# Patient Record
Sex: FEMALE | Race: WHITE | NOT HISPANIC OR LATINO | Employment: UNEMPLOYED | URBAN - METROPOLITAN AREA
[De-identification: names, ages, dates, MRNs, and addresses within clinical notes are randomized per-mention and may not be internally consistent; named-entity substitution may affect disease eponyms.]

---

## 2023-09-01 PROBLEM — I20.9 ANGINA PECTORIS (CMS-HCC): Status: ACTIVE | Noted: 2023-09-01

## 2023-09-01 PROBLEM — K29.60: Status: ACTIVE | Noted: 2023-09-01

## 2023-09-01 PROBLEM — E55.9 VITAMIN D DEFICIENCY, UNSPECIFIED: Status: ACTIVE | Noted: 2023-09-01

## 2023-09-01 PROBLEM — E78.5 HYPERLIPIDEMIA: Status: ACTIVE | Noted: 2023-09-01

## 2023-09-01 PROBLEM — I10 BENIGN ESSENTIAL HYPERTENSION: Status: ACTIVE | Noted: 2023-09-01

## 2023-09-01 PROBLEM — I10 HYPERTENSIVE DISORDER: Status: ACTIVE | Noted: 2023-09-01

## 2023-09-01 PROBLEM — E83.52 HYPERCALCEMIA: Status: ACTIVE | Noted: 2023-09-01

## 2023-09-01 PROBLEM — I48.20 CHRONIC ATRIAL FIBRILLATION (MULTI): Status: ACTIVE | Noted: 2023-09-01

## 2023-09-01 PROBLEM — M81.0 AGE-RELATED OSTEOPOROSIS WITHOUT CURRENT PATHOLOGICAL FRACTURE: Status: ACTIVE | Noted: 2023-09-01

## 2023-09-01 PROBLEM — E87.1 HYPONATREMIA: Status: ACTIVE | Noted: 2023-09-01

## 2023-09-01 PROBLEM — H04.123 DRY EYES, BILATERAL: Status: ACTIVE | Noted: 2023-09-01

## 2023-09-01 PROBLEM — R07.9 CHEST PAIN: Status: ACTIVE | Noted: 2023-09-01
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
## 2023-09-01 PROBLEM — E03.9 HYPOTHYROIDISM (ACQUIRED): Status: ACTIVE | Noted: 2023-09-01

## 2023-09-01 PROBLEM — E21.3 HYPERPARATHYROIDISM (MULTI): Status: ACTIVE | Noted: 2023-09-01

## 2023-09-01 PROBLEM — I49.5 SINUS NODE DYSFUNCTION (MULTI): Status: ACTIVE | Noted: 2023-09-01

## 2023-09-01 PROBLEM — R54 FRAIL ELDERLY: Status: ACTIVE | Noted: 2023-09-01

## 2023-09-01 PROBLEM — K29.31 CHRONIC SUPERFICIAL GASTRITIS WITH BLEEDING: Status: ACTIVE | Noted: 2023-09-01

## 2023-09-01 PROBLEM — I25.10 ASHD (ARTERIOSCLEROTIC HEART DISEASE): Status: ACTIVE | Noted: 2023-09-01

## 2023-09-01 PROBLEM — R10.9 ABDOMINAL PAIN: Status: ACTIVE | Noted: 2023-09-01

## 2023-09-01 PROBLEM — Z86.59 MENTAL STATUS CHANGE RESOLVED: Status: ACTIVE | Noted: 2023-09-01

## 2023-09-01 PROBLEM — J45.998 ASTHMA, PERSISTENT CONTROLLED (HHS-HCC): Status: ACTIVE | Noted: 2023-09-01

## 2023-09-01 PROBLEM — R55 NEAR SYNCOPE: Status: ACTIVE | Noted: 2023-09-01

## 2023-09-01 PROBLEM — Z95.0 PACEMAKER: Status: ACTIVE | Noted: 2023-09-01

## 2023-09-01 PROBLEM — R42 DIZZINESS: Status: ACTIVE | Noted: 2023-09-01

## 2023-09-01 PROBLEM — F32.9 REACTIVE DEPRESSION: Status: ACTIVE | Noted: 2023-09-01

## 2023-09-01 PROBLEM — M06.9 RHEUMATOID ARTHRITIS IN REMISSION (MULTI): Status: ACTIVE | Noted: 2023-09-01

## 2023-09-01 PROBLEM — I34.1 MITRAL VALVE PROLAPSE: Status: ACTIVE | Noted: 2023-09-01

## 2023-09-01 PROBLEM — R55 SYNCOPE: Status: ACTIVE | Noted: 2023-09-01

## 2023-09-01 PROBLEM — F03.90 DEMENTIA (MULTI): Status: ACTIVE | Noted: 2023-09-01

## 2023-09-01 PROBLEM — R40.1 CLOUDED CONSCIOUSNESS: Status: ACTIVE | Noted: 2023-09-01

## 2023-09-01 RX ORDER — GUAIFENESIN 600 MG/1
600 TABLET, EXTENDED RELEASE ORAL EVERY 12 HOURS
COMMUNITY

## 2023-09-01 RX ORDER — DOCUSATE SODIUM 100 MG/1
100 CAPSULE, LIQUID FILLED ORAL DAILY PRN
COMMUNITY

## 2023-09-01 RX ORDER — AMLODIPINE BESYLATE 10 MG/1
1 TABLET ORAL 2 TIMES DAILY
COMMUNITY

## 2023-09-01 RX ORDER — BETAMETHASONE DIPROPIONATE 0.5 MG/G
1 CREAM TOPICAL DAILY
COMMUNITY

## 2023-09-01 RX ORDER — CYCLOSPORINE 0.5 MG/ML
1 EMULSION OPHTHALMIC 2 TIMES DAILY
COMMUNITY

## 2023-09-01 RX ORDER — RANOLAZINE 500 MG/1
500 TABLET, EXTENDED RELEASE ORAL 2 TIMES DAILY
COMMUNITY

## 2023-09-01 RX ORDER — LISINOPRIL 20 MG/1
20 TABLET ORAL DAILY
COMMUNITY

## 2023-09-01 RX ORDER — ISOSORBIDE MONONITRATE 60 MG/1
60 TABLET, EXTENDED RELEASE ORAL EVERY MORNING
COMMUNITY

## 2023-09-01 RX ORDER — NITROGLYCERIN 0.4 MG/1
TABLET SUBLINGUAL
COMMUNITY

## 2023-09-01 RX ORDER — LEVOTHYROXINE SODIUM 50 UG/1
50 TABLET ORAL
COMMUNITY

## 2023-09-01 RX ORDER — SOTALOL HYDROCHLORIDE 120 MG/1
120 TABLET ORAL 2 TIMES DAILY
COMMUNITY

## 2023-09-01 RX ORDER — BROMPHENIRAMINE MALEATE, DEXTROMETHORPHAN HBR, PHENYLEPHRINE HCL, DIPHENHYDRAMINE HCL, PHENYLEPHRINE HCL 0.52G
2 KIT ORAL 3 TIMES DAILY
COMMUNITY

## 2023-09-01 RX ORDER — OMEPRAZOLE 20 MG/1
20 CAPSULE, DELAYED RELEASE ORAL
COMMUNITY

## 2023-09-01 RX ORDER — FLUTICASONE PROPIONATE 50 MCG
SPRAY, SUSPENSION (ML) NASAL DAILY
COMMUNITY

## 2023-09-01 RX ORDER — ATORVASTATIN CALCIUM 10 MG/1
10 TABLET, FILM COATED ORAL DAILY
COMMUNITY
End: 2024-01-17

## 2023-09-01 RX ORDER — WARFARIN 3 MG/1
1 TABLET ORAL
COMMUNITY

## 2023-11-08 ENCOUNTER — HOSPITAL ENCOUNTER (OUTPATIENT)
Dept: CARDIOLOGY | Facility: CLINIC | Age: 83
Discharge: HOME | End: 2023-11-08
Payer: MEDICARE

## 2023-11-08 DIAGNOSIS — I48.0 PAF (PAROXYSMAL ATRIAL FIBRILLATION) (MULTI): ICD-10-CM

## 2023-11-08 DIAGNOSIS — I49.5 SINUS NODE DYSFUNCTION (MULTI): ICD-10-CM

## 2023-11-08 DIAGNOSIS — Z95.0 PACEMAKER: ICD-10-CM

## 2023-11-08 PROCEDURE — 93296 REM INTERROG EVL PM/IDS: CPT

## 2023-12-04 ENCOUNTER — HOSPITAL ENCOUNTER (OUTPATIENT)
Dept: CARDIOLOGY | Facility: CLINIC | Age: 83
Discharge: HOME | End: 2023-12-04
Payer: MEDICARE

## 2023-12-04 DIAGNOSIS — I49.5 SINUS NODE DYSFUNCTION (MULTI): ICD-10-CM

## 2023-12-04 DIAGNOSIS — Z95.0 PACEMAKER: ICD-10-CM

## 2023-12-04 DIAGNOSIS — I48.0 PAF (PAROXYSMAL ATRIAL FIBRILLATION) (MULTI): ICD-10-CM

## 2023-12-06 ENCOUNTER — APPOINTMENT (OUTPATIENT)
Dept: CARDIOLOGY | Facility: CLINIC | Age: 83
End: 2023-12-06
Payer: MEDICARE

## 2023-12-14 ENCOUNTER — ANCILLARY PROCEDURE (OUTPATIENT)
Dept: EMERGENCY MEDICINE | Facility: HOSPITAL | Age: 83
End: 2023-12-14
Payer: MEDICARE

## 2023-12-14 ENCOUNTER — HOSPITAL ENCOUNTER (EMERGENCY)
Facility: HOSPITAL | Age: 83
Discharge: HOME | End: 2023-12-14
Attending: EMERGENCY MEDICINE
Payer: MEDICARE

## 2023-12-14 ENCOUNTER — APPOINTMENT (OUTPATIENT)
Dept: RADIOLOGY | Facility: HOSPITAL | Age: 83
End: 2023-12-14
Payer: MEDICARE

## 2023-12-14 VITALS
DIASTOLIC BLOOD PRESSURE: 71 MMHG | OXYGEN SATURATION: 99 % | HEIGHT: 64 IN | BODY MASS INDEX: 18.78 KG/M2 | HEART RATE: 71 BPM | WEIGHT: 110 LBS | SYSTOLIC BLOOD PRESSURE: 103 MMHG | TEMPERATURE: 98.4 F | RESPIRATION RATE: 17 BRPM

## 2023-12-14 DIAGNOSIS — R10.9 ABDOMINAL PAIN, UNSPECIFIED ABDOMINAL LOCATION: ICD-10-CM

## 2023-12-14 DIAGNOSIS — N39.0 URINARY TRACT INFECTION WITHOUT HEMATURIA, SITE UNSPECIFIED: ICD-10-CM

## 2023-12-14 DIAGNOSIS — K59.00 CONSTIPATION, UNSPECIFIED CONSTIPATION TYPE: Primary | ICD-10-CM

## 2023-12-14 LAB
ALBUMIN SERPL BCP-MCNC: 3.8 G/DL (ref 3.4–5)
ALP SERPL-CCNC: 79 U/L (ref 33–136)
ALT SERPL W P-5'-P-CCNC: 15 U/L (ref 7–45)
ANION GAP BLDV CALCULATED.4IONS-SCNC: 11 MMOL/L (ref 10–25)
ANION GAP SERPL CALC-SCNC: 11 MMOL/L (ref 10–20)
APPEARANCE UR: CLEAR
AST SERPL W P-5'-P-CCNC: 20 U/L (ref 9–39)
ATRIAL RATE: 81 BPM
BASE EXCESS BLDV CALC-SCNC: 2.6 MMOL/L (ref -2–3)
BASOPHILS # BLD AUTO: 0.06 X10*3/UL (ref 0–0.1)
BASOPHILS NFR BLD AUTO: 0.9 %
BILIRUB SERPL-MCNC: 0.5 MG/DL (ref 0–1.2)
BILIRUB UR STRIP.AUTO-MCNC: NEGATIVE MG/DL
BODY TEMPERATURE: 37 DEGREES CELSIUS
BUN SERPL-MCNC: 18 MG/DL (ref 6–23)
CA-I BLDV-SCNC: 1.24 MMOL/L (ref 1.1–1.33)
CALCIUM SERPL-MCNC: 9.6 MG/DL (ref 8.6–10.6)
CARDIAC TROPONIN I PNL SERPL HS: 5 NG/L (ref 0–34)
CHLORIDE BLDV-SCNC: 100 MMOL/L (ref 98–107)
CHLORIDE SERPL-SCNC: 101 MMOL/L (ref 98–107)
CO2 SERPL-SCNC: 30 MMOL/L (ref 21–32)
COLOR UR: YELLOW
CREAT SERPL-MCNC: 0.92 MG/DL (ref 0.5–1.05)
EOSINOPHIL # BLD AUTO: 0.11 X10*3/UL (ref 0–0.4)
EOSINOPHIL NFR BLD AUTO: 1.7 %
ERYTHROCYTE [DISTWIDTH] IN BLOOD BY AUTOMATED COUNT: 12.8 % (ref 11.5–14.5)
GFR SERPL CREATININE-BSD FRML MDRD: 63 ML/MIN/1.73M*2
GLUCOSE BLDV-MCNC: 139 MG/DL (ref 74–99)
GLUCOSE SERPL-MCNC: 155 MG/DL (ref 74–99)
GLUCOSE UR STRIP.AUTO-MCNC: NEGATIVE MG/DL
HCO3 BLDV-SCNC: 28.8 MMOL/L (ref 22–26)
HCT VFR BLD AUTO: 32.8 % (ref 36–46)
HCT VFR BLD EST: 32 % (ref 36–46)
HGB BLD-MCNC: 10.9 G/DL (ref 12–16)
HGB BLDV-MCNC: 10.7 G/DL (ref 12–16)
HOLD SPECIMEN: NORMAL
IMM GRANULOCYTES # BLD AUTO: 0.02 X10*3/UL (ref 0–0.5)
IMM GRANULOCYTES NFR BLD AUTO: 0.3 % (ref 0–0.9)
KETONES UR STRIP.AUTO-MCNC: NEGATIVE MG/DL
LACTATE BLDV-SCNC: 2.3 MMOL/L (ref 0.4–2)
LEUKOCYTE ESTERASE UR QL STRIP.AUTO: NEGATIVE
LIPASE SERPL-CCNC: 47 U/L (ref 9–82)
LYMPHOCYTES # BLD AUTO: 1.08 X10*3/UL (ref 0.8–3)
LYMPHOCYTES NFR BLD AUTO: 17 %
MAGNESIUM SERPL-MCNC: 2.12 MG/DL (ref 1.6–2.4)
MCH RBC QN AUTO: 30.1 PG (ref 26–34)
MCHC RBC AUTO-ENTMCNC: 33.2 G/DL (ref 32–36)
MCV RBC AUTO: 91 FL (ref 80–100)
MONOCYTES # BLD AUTO: 0.44 X10*3/UL (ref 0.05–0.8)
MONOCYTES NFR BLD AUTO: 6.9 %
NEUTROPHILS # BLD AUTO: 4.65 X10*3/UL (ref 1.6–5.5)
NEUTROPHILS NFR BLD AUTO: 73.2 %
NITRITE UR QL STRIP.AUTO: POSITIVE
NRBC BLD-RTO: 0 /100 WBCS (ref 0–0)
OXYHGB MFR BLDV: 61.2 % (ref 45–75)
PCO2 BLDV: 51 MM HG (ref 41–51)
PH BLDV: 7.36 PH (ref 7.33–7.43)
PH UR STRIP.AUTO: 7 [PH]
PLATELET # BLD AUTO: 190 X10*3/UL (ref 150–450)
PO2 BLDV: 39 MM HG (ref 35–45)
POTASSIUM BLDV-SCNC: 3.8 MMOL/L (ref 3.5–5.3)
POTASSIUM SERPL-SCNC: 5.1 MMOL/L (ref 3.5–5.3)
PR INTERVAL: 348 MS
PROT SERPL-MCNC: 6.7 G/DL (ref 6.4–8.2)
PROT UR STRIP.AUTO-MCNC: NEGATIVE MG/DL
Q ONSET: 218 MS
QRS COUNT: 14 BEATS
QRS DURATION: 126 MS
QT INTERVAL: 420 MS
QTC CALCULATION(BAZETT): 487 MS
QTC FREDERICIA: 464 MS
R AXIS: 35 DEGREES
RBC # BLD AUTO: 3.62 X10*6/UL (ref 4–5.2)
RBC # UR STRIP.AUTO: NEGATIVE /UL
RBC #/AREA URNS AUTO: NORMAL /HPF
SAO2 % BLDV: 62 % (ref 45–75)
SODIUM BLDV-SCNC: 136 MMOL/L (ref 136–145)
SODIUM SERPL-SCNC: 137 MMOL/L (ref 136–145)
SP GR UR STRIP.AUTO: 1.02
T AXIS: 92 DEGREES
T OFFSET: 428 MS
UROBILINOGEN UR STRIP.AUTO-MCNC: <2 MG/DL
VENTRICULAR RATE: 81 BPM
WBC # BLD AUTO: 6.4 X10*3/UL (ref 4.4–11.3)
WBC #/AREA URNS AUTO: NORMAL /HPF

## 2023-12-14 PROCEDURE — 2500000004 HC RX 250 GENERAL PHARMACY W/ HCPCS (ALT 636 FOR OP/ED)

## 2023-12-14 PROCEDURE — 84484 ASSAY OF TROPONIN QUANT: CPT

## 2023-12-14 PROCEDURE — 99285 EMERGENCY DEPT VISIT HI MDM: CPT | Mod: 25

## 2023-12-14 PROCEDURE — 36415 COLL VENOUS BLD VENIPUNCTURE: CPT

## 2023-12-14 PROCEDURE — 82435 ASSAY OF BLOOD CHLORIDE: CPT

## 2023-12-14 PROCEDURE — 83735 ASSAY OF MAGNESIUM: CPT

## 2023-12-14 PROCEDURE — 99284 EMERGENCY DEPT VISIT MOD MDM: CPT | Performed by: EMERGENCY MEDICINE

## 2023-12-14 PROCEDURE — 71045 X-RAY EXAM CHEST 1 VIEW: CPT | Mod: FOREIGN READ | Performed by: RADIOLOGY

## 2023-12-14 PROCEDURE — 85025 COMPLETE CBC W/AUTO DIFF WBC: CPT

## 2023-12-14 PROCEDURE — 2550000001 HC RX 255 CONTRASTS: Performed by: EMERGENCY MEDICINE

## 2023-12-14 PROCEDURE — 93005 ELECTROCARDIOGRAM TRACING: CPT

## 2023-12-14 PROCEDURE — 96360 HYDRATION IV INFUSION INIT: CPT | Mod: 59

## 2023-12-14 PROCEDURE — 83690 ASSAY OF LIPASE: CPT

## 2023-12-14 PROCEDURE — 81003 URINALYSIS AUTO W/O SCOPE: CPT

## 2023-12-14 PROCEDURE — 74177 CT ABD & PELVIS W/CONTRAST: CPT | Mod: FOREIGN READ | Performed by: RADIOLOGY

## 2023-12-14 PROCEDURE — 71045 X-RAY EXAM CHEST 1 VIEW: CPT | Mod: FR

## 2023-12-14 PROCEDURE — 87086 URINE CULTURE/COLONY COUNT: CPT

## 2023-12-14 PROCEDURE — 85018 HEMOGLOBIN: CPT | Mod: 91

## 2023-12-14 PROCEDURE — 93010 ELECTROCARDIOGRAM REPORT: CPT | Performed by: EMERGENCY MEDICINE

## 2023-12-14 PROCEDURE — 99285 EMERGENCY DEPT VISIT HI MDM: CPT | Performed by: EMERGENCY MEDICINE

## 2023-12-14 PROCEDURE — 84132 ASSAY OF SERUM POTASSIUM: CPT | Mod: 91

## 2023-12-14 PROCEDURE — 74177 CT ABD & PELVIS W/CONTRAST: CPT

## 2023-12-14 RX ORDER — POLYETHYLENE GLYCOL 3350 17 G/17G
17 POWDER, FOR SOLUTION ORAL 2 TIMES DAILY
Qty: 510 G | Refills: 0 | Status: SHIPPED | OUTPATIENT
Start: 2023-12-14

## 2023-12-14 RX ORDER — CEPHALEXIN 500 MG/1
500 CAPSULE ORAL 2 TIMES DAILY
Qty: 14 CAPSULE | Refills: 0 | Status: SHIPPED | OUTPATIENT
Start: 2023-12-14 | End: 2023-12-21

## 2023-12-14 RX ADMIN — IOHEXOL 70 ML: 350 INJECTION, SOLUTION INTRAVENOUS at 12:01

## 2023-12-14 RX ADMIN — SODIUM CHLORIDE, POTASSIUM CHLORIDE, SODIUM LACTATE AND CALCIUM CHLORIDE 1000 ML: 600; 310; 30; 20 INJECTION, SOLUTION INTRAVENOUS at 11:11

## 2023-12-14 ASSESSMENT — COLUMBIA-SUICIDE SEVERITY RATING SCALE - C-SSRS
1. IN THE PAST MONTH, HAVE YOU WISHED YOU WERE DEAD OR WISHED YOU COULD GO TO SLEEP AND NOT WAKE UP?: NO
2. HAVE YOU ACTUALLY HAD ANY THOUGHTS OF KILLING YOURSELF?: NO
6. HAVE YOU EVER DONE ANYTHING, STARTED TO DO ANYTHING, OR PREPARED TO DO ANYTHING TO END YOUR LIFE?: NO

## 2023-12-14 NOTE — PROGRESS NOTES
Handoff Note    I received Julieth Arana in signout from Dr. Correa.  Please see the previous note for all HPI, PE and MDM up to the time of signout at 1500.    In brief Julieth Arana is an 83 y.o. female presenting for   Chief Complaint   Patient presents with    Abdominal Pain   .      At the time of signout, the patient's disposition is pending urinalysis.  This returned positive for nitrites and will be sent for microscopy.  Given nitrite positivity we will go ahead and treat her empirically.  Prescription sent for Keflex to treat her urinary tract infection.  Prescription also sent for MiraLAX for her constipation chronically.  Patient resides at a skilled nursing facility.  Results discussed with her including on plan for discharge with these medications.  She is in agreement with the plan.  She remained hemodynamically stable will be discharged home.      Throughout the ED stay, the patient was monitored and re-examined for any changes in stability or symptomatology.  The patient was instructed to return to the ED if any symptoms recurred, worsened, or if there was any additional concerns.    ED Course:   ED Course as of 12/14/23 1818   Thu Dec 14, 2023   0958 Bedside CXR performed and reviewed. No free air under the diaphragm [RR]   1010 Atrial paced rhythm with a rate of 81.  OH prolongation to 340.  QRS mildly prolonged at 126.  Left bundle branch block noted in all leads without any [RR]      ED Course User Index  [RR] Jacqui Correa MD         Diagnoses as of 12/14/23 1818   Constipation, unspecified constipation type   Abdominal pain, unspecified abdominal location   Urinary tract infection without hematuria, site unspecified         Patient seen by and discussed with Dr. Larsen     Pt Disposition: Discharge    Procedures      Vishnu Mills DO  Emergency Medicine PGY-2  [facility]

## 2023-12-14 NOTE — ED PROVIDER NOTES
Patient CT abdomen pelvis with contrast CC: Abdominal Pain     HPI:   Patient is a 83-year-old female with past medical history of early dementia, hypertension, A-fib on warfarin, history of pacemaker presenting from her nursing facility due to concerns for nausea and vomiting this morning.  Patient was eating breakfast and had sudden significant abdominal pain with a very tender abdomen.  Patient had 2 episodes of nonbilious nonbloody emesis.  She denies any diarrhea or or urinary incontinence at this time.  Patient reports she feels worse pain in her right lower quadrant and EMS reports that her initial nausea and vomiting was associated with diaphoresis.  Patient has good bilateral DP and radial pulses.  Patient is reporting substernal chest pain without radiation to her bilateral upper extremities or to her back or to her neck.  She reports that after she threw up, she has throat pain associated with her substernal chest pain.  Patient is currently hemodynamically stable on room air with the noted sinus rhythm on telemetry monitoring.  Patient does [place] any headache, vision changes, shortness of breath, weakness or numbness of upper or lower extremities.    Limitations to History: dementia  Additional History Obtained from: EMS    PMHx/PSHx:  Per HPI.   - has no past medical history on file.  - has no past surgical history on file.    Social History:  - Tobacco:  has no history on file for tobacco use.   - Alcohol:  has no history on file for alcohol use.   - Drugs:  has no history on file for drug use.     Medications: Reviewed in EMR.     Allergies:  Codeine and Vancomycin    ???????????????????????????????????????????????????????????????  Triage Vitals:  T    HR    BP    RR    O2        Physical Exam  Vitals and nursing note reviewed.   Constitutional:       General: She is not in acute distress.     Appearance: She is well-developed.   HENT:      Head: Normocephalic and atraumatic.      Mouth/Throat:       Mouth: Mucous membranes are dry.      Pharynx: Oropharynx is clear.   Eyes:      Conjunctiva/sclera: Conjunctivae normal.   Cardiovascular:      Rate and Rhythm: Normal rate and regular rhythm.      Heart sounds: No murmur heard.  Pulmonary:      Effort: Pulmonary effort is normal. No respiratory distress.      Breath sounds: Normal breath sounds. No wheezing, rhonchi or rales.   Abdominal:      General: There is no distension.      Palpations: Abdomen is soft.      Tenderness: There is abdominal tenderness in the right lower quadrant. There is guarding. There is no rebound. Positive signs include Rovsing's sign.   Musculoskeletal:         General: No swelling or tenderness.      Cervical back: Neck supple.   Skin:     General: Skin is warm and dry.      Capillary Refill: Capillary refill takes less than 2 seconds.   Neurological:      Mental Status: She is alert and oriented to person, place, and time.      Sensory: No sensory deficit.      Motor: No weakness.      Gait: Gait normal.   Psychiatric:         Mood and Affect: Mood normal.       ???????????????????????????????????????????????????????????????  EKG (per my interpretation):  AV paced rhythm with a rate of 81.  ME interval of 350 with mildly prolonged QRS of 126.  QTc 580.  No acute ST or T wave changes noted.  Left bundle branch block.    ED Course  ED Course as of 12/15/23 0818   Thu Dec 14, 2023   0958 Bedside CXR performed and reviewed. No free air under the diaphragm [RR]   1010 Atrial paced rhythm with a rate of 81.  ME prolongation to 340.  QRS mildly prolonged at 126.  Left bundle branch block noted in all leads without any [RR]      ED Course User Index  [RR] Jacqui Correa MD         Diagnoses as of 12/15/23 0818   Constipation, unspecified constipation type   Abdominal pain, unspecified abdominal location   Urinary tract infection without hematuria, site unspecified       Medical Decision Making:  Patient is a 83-year-old female with  history of dementia, hypertension, A-fib presenting due to nausea and vomiting with abdominal pain for the past few hours.  Differentials considered but not limited to dissection, ACS, pneumothorax pneumomediastinum, esophageal rupture, bowel perforation, volvulus, SBO, diverticulitis, appendicitis, UTI, pyelonephritis, nephrolithiasis.    Patient is an extremely tender abdominal exam on initial evaluation and EKG was initially obtained to rule out ACS.  Patient also had upright chest x-ray performed to evaluate for free air under the diaphragm which was negative.  Patient CT abdomen pelvis only showed signs only concerning for constipation.  Patient is pending urinalysis given her history of UTIs to evaluate for any other causes of abdominal pain.  Patient does report that all her pain is in her abdomen but does report that she has chronic abdominal pain.  If urinalysis is unremarkable, can be discharged with pain control.  If patient has positive urinalysis, will be treated appropriately.  Patient will need prescription of an appropriate bowel regimen.  Patient care handed off to oncoming ED provider pending straight cath to obtain urine sample.    External records reviewed: recent inpatient, clinic, and prior ED notes  Diagnostic imaging independently reviewed/interpreted by me (as reflected in MDM) includes: CT ab/pel  Social Determinants Affecting Care:   Discussion of management with other providers: attending  Prescription Drug Consideration: miralax  Escalation of Care: none    Impression:   Constipation  Abdominal Pain    Disposition: Handoff      Procedures ? SmartLinks last updated 12/14/2023 10:06 AM     Jacqui Correa  PGY-2 Emergency Medicine  [facility]     Jacqui Correa MD  Resident  12/15/23 2309

## 2023-12-16 LAB
BACTERIA UR CULT: ABNORMAL
BACTERIA UR CULT: ABNORMAL

## 2023-12-17 ENCOUNTER — TELEPHONE (OUTPATIENT)
Dept: PHARMACY | Facility: HOSPITAL | Age: 83
End: 2023-12-17
Payer: MEDICARE

## 2023-12-17 NOTE — PROGRESS NOTES
EDPD Note: Lab/Chart Reviewed    Reviewed Mrs./Ms. Julieth Arana 's chart regarding a positive Urine culture that was taken during their recent emergency room visit. The patient was transferred back to their rehab/LTC facility .Therefore, I have faxed this information to [facility] at fax number [phone] .    Susceptibility data from last 90 days.  Collected Specimen Info Organism Amoxicillin/Clavulanate Ampicillin Ampicillin/Sulbactam Cefazolin Cefazolin (uncomplicated UTIs only) Ciprofloxacin Gentamicin Nitrofurantoin Piperacillin/Tazobactam Trimethoprim/Sulfamethoxazole   12/14/23 Urine from Clean Catch/Voided Klebsiella pneumoniae/variicola I R R S S S S R S S     Escherichia coli  S  S S S S S S S       No further follow up needed from EDPD Team.     Jamshid Denton, PharmD

## 2024-01-17 ENCOUNTER — HOSPITAL ENCOUNTER (EMERGENCY)
Facility: HOSPITAL | Age: 84
Discharge: HOME | End: 2024-01-18
Attending: EMERGENCY MEDICINE
Payer: MEDICARE

## 2024-01-17 DIAGNOSIS — N94.9 VAGINAL DISCOMFORT: Primary | ICD-10-CM

## 2024-01-17 DIAGNOSIS — E78.5 HYPERLIPIDEMIA, UNSPECIFIED HYPERLIPIDEMIA TYPE: ICD-10-CM

## 2024-01-17 PROCEDURE — 99283 EMERGENCY DEPT VISIT LOW MDM: CPT

## 2024-01-17 PROCEDURE — 99283 EMERGENCY DEPT VISIT LOW MDM: CPT | Performed by: EMERGENCY MEDICINE

## 2024-01-17 PROCEDURE — 99284 EMERGENCY DEPT VISIT MOD MDM: CPT | Performed by: EMERGENCY MEDICINE

## 2024-01-17 RX ORDER — ATORVASTATIN CALCIUM 10 MG/1
10 TABLET, FILM COATED ORAL DAILY
Qty: 90 TABLET | Refills: 3 | Status: SHIPPED | OUTPATIENT
Start: 2024-01-17

## 2024-01-17 ASSESSMENT — PAIN SCALES - GENERAL: PAINLEVEL_OUTOF10: 0 - NO PAIN

## 2024-01-17 ASSESSMENT — LIFESTYLE VARIABLES
HAVE YOU EVER FELT YOU SHOULD CUT DOWN ON YOUR DRINKING: NO
EVER HAD A DRINK FIRST THING IN THE MORNING TO STEADY YOUR NERVES TO GET RID OF A HANGOVER: NO
EVER FELT BAD OR GUILTY ABOUT YOUR DRINKING: NO
REASON UNABLE TO ASSESS: NO
HAVE PEOPLE ANNOYED YOU BY CRITICIZING YOUR DRINKING: NO

## 2024-01-17 ASSESSMENT — COLUMBIA-SUICIDE SEVERITY RATING SCALE - C-SSRS
2. HAVE YOU ACTUALLY HAD ANY THOUGHTS OF KILLING YOURSELF?: NO
1. IN THE PAST MONTH, HAVE YOU WISHED YOU WERE DEAD OR WISHED YOU COULD GO TO SLEEP AND NOT WAKE UP?: NO
6. HAVE YOU EVER DONE ANYTHING, STARTED TO DO ANYTHING, OR PREPARED TO DO ANYTHING TO END YOUR LIFE?: NO

## 2024-01-17 ASSESSMENT — PAIN - FUNCTIONAL ASSESSMENT: PAIN_FUNCTIONAL_ASSESSMENT: 0-10

## 2024-01-18 VITALS
OXYGEN SATURATION: 95 % | BODY MASS INDEX: 18.78 KG/M2 | DIASTOLIC BLOOD PRESSURE: 77 MMHG | HEART RATE: 86 BPM | SYSTOLIC BLOOD PRESSURE: 124 MMHG | HEIGHT: 64 IN | RESPIRATION RATE: 14 BRPM | TEMPERATURE: 97.4 F | WEIGHT: 110 LBS

## 2024-01-18 PROCEDURE — 2500000004 HC RX 250 GENERAL PHARMACY W/ HCPCS (ALT 636 FOR OP/ED): Performed by: STUDENT IN AN ORGANIZED HEALTH CARE EDUCATION/TRAINING PROGRAM

## 2024-01-18 RX ORDER — OLANZAPINE 5 MG/1
5 TABLET ORAL ONCE
Status: COMPLETED | OUTPATIENT
Start: 2024-01-18 | End: 2024-01-18

## 2024-01-18 RX ADMIN — OLANZAPINE 5 MG: 5 TABLET, FILM COATED ORAL at 00:31

## 2024-01-18 NOTE — DISCHARGE INSTRUCTIONS
We did not notice any prolapse of any reproductive organs.  There was no blood coming from Ms. Clancy vagina.  As such, she is stable to follow up.  If there is continued concern, I would recommend following up with GYN.

## 2024-01-18 NOTE — ED TRIAGE NOTES
Pt brought in by [facility] from [place]. Staff noticed prolapse when putting pt to bed. Pt Aox1, pt denies pain, pt walks without assistance.

## 2024-01-18 NOTE — ED PROVIDER NOTES
CC: Vaginal Prolapse     HPI:  Julieth Arana is a 83 y.o. female with a past medical history of severe dementia, alert and oriented x 0 at baseline, hypertension, A-fib on warfarin, pacemaker, presenting to the emergency department from skilled nursing facility due to concern for prolapsed uterus.  Per nursing report, when the staff at the nursing facility went to change the patient's depends, they noted that there was a protruding mass concerning for prolapsed uterus.  As such, they transferred here for further evaluation.  The patient appears diffusely confused.  She is not able to provide any history including her name.  However she denies any significant pain.    Limitations to History:  Baseline alert and oriented x 0, confused  Additional History provided by:  Report from skilled nursing facility    External Records Reviewed:  Recent available ED and inpatient notes reviewed in EMR.    PMHx/PSHx:  Per HPI.   - has no past medical history on file.  - has no past surgical history on file.    Medications:  Reviewed in EMR. See EMR for complete list of medications and doses.    Allergies:  Codeine and Vancomycin    Social History:  - Tobacco:  has no history on file for tobacco use.   - Alcohol:  has no history on file for alcohol use.   - Illicit Drugs:  has no history on file for drug use.     ROS:  Per HPI.     ???????????????????????????????????????????????????????????????  Triage Vitals:  T 36.4 °C (97.5 °F)  HR 73  /58  RR 14  O2 97 % None (Room air)    Physical Exam  Vitals and nursing note reviewed. Exam conducted with a chaperone present.   Constitutional:       General: She is not in acute distress.     Appearance: She is well-developed.   HENT:      Head: Normocephalic and atraumatic.   Eyes:      Conjunctiva/sclera: Conjunctivae normal.   Cardiovascular:      Rate and Rhythm: Normal rate and regular rhythm.      Heart sounds: No murmur heard.  Pulmonary:      Effort: Pulmonary effort is  normal. No respiratory distress.      Breath sounds: Normal breath sounds.   Abdominal:      Palpations: Abdomen is soft.      Tenderness: There is no abdominal tenderness.   Genitourinary:     General: Normal vulva.      Pubic Area: No rash.       Labia:         Right: No rash.         Left: No rash.       Urethra: No prolapse or urethral swelling.      Vagina: Normal. No prolapsed vaginal walls.      Uterus: Normal. No uterine prolapse.       Comments: No bleeding, redness, or any signs of trauma, or abnormalities in the genital region.  Musculoskeletal:         General: No swelling.      Cervical back: Neck supple.   Skin:     General: Skin is warm and dry.      Capillary Refill: Capillary refill takes less than 2 seconds.   Neurological:      Mental Status: She is alert.   Psychiatric:         Mood and Affect: Mood normal.       ???????????????????????????????????????????????????????????????  ED Course:  Diagnoses as of 01/20/24 0817   Vaginal discomfort       EKG & Images:  Independently reviewed, See ED Course      MDM:  -The patient is an 83-year-old woman with severe dementia presenting to the emergency department today due to concerns for uterine prolapse from the skilled nursing facility after they noted an abnormality in her genital region while changing her depends.  Unfortunately, the patient is not able to provide any history as she is she is severely disoriented at baseline unable to provide her own name.  However, on exam she is nontoxic-appearing, her abdomen is soft nontender.  She is afebrile with normal vital signs.  Genital exam was done with bedside nurse present as a chaperone.  No uterine prolapse was noted, in fact, no bleeding, or abnormal anatomy was noted on vaginal exam.  There is no signs of rash.  As such, unclear of what brought the patient to the emergency room however there does not appear to be any prolapse at this time.  Recommend that she follows up with GYN should this recur as  she may benefit from procedure to prevent further prolapse.  This was included in the discharge paperwork and the patient was stable for discharge back to the skilled nursing facility.  Unfortunately, due to her dementia, she is becoming more delirious thus required a dose of Zyprexa for anxiolysis while pending transport back to her home.    Final diagnoses:   [N94.9] Vaginal discomfort         Social Determinants Limiting Care:  Mental health issues    Disposition:  Discharge    Stefania Franco MD   Emergency Medicine Resident, PGY3  [facility]     Disclaimer: This note was dictated by speech recognition. Minor errors in transcription may be present    Procedures ? FIZZA last updated 1/20/2024 8:17 AM        Stefania Franco MD  Resident  01/20/24 0817

## 2024-02-07 ENCOUNTER — HOSPITAL ENCOUNTER (OUTPATIENT)
Dept: CARDIOLOGY | Facility: CLINIC | Age: 84
Discharge: HOME | End: 2024-02-07
Payer: COMMERCIAL

## 2024-02-07 DIAGNOSIS — I49.5 SINUS NODE DYSFUNCTION (MULTI): ICD-10-CM

## 2024-02-07 DIAGNOSIS — Z95.0 PACEMAKER: ICD-10-CM

## 2024-02-07 DIAGNOSIS — I48.0 PAF (PAROXYSMAL ATRIAL FIBRILLATION) (MULTI): ICD-10-CM

## 2024-02-07 PROCEDURE — 93294 REM INTERROG EVL PM/LDLS PM: CPT | Performed by: INTERNAL MEDICINE

## 2024-02-07 PROCEDURE — 93296 REM INTERROG EVL PM/IDS: CPT

## 2024-05-08 ENCOUNTER — HOSPITAL ENCOUNTER (OUTPATIENT)
Dept: CARDIOLOGY | Facility: CLINIC | Age: 84
Discharge: HOME | End: 2024-05-08
Payer: MEDICARE

## 2024-05-08 DIAGNOSIS — Z95.0 CARDIAC PACEMAKER: ICD-10-CM

## 2024-05-08 DIAGNOSIS — I49.5 SSS (SICK SINUS SYNDROME) (MULTI): ICD-10-CM

## 2024-05-08 PROCEDURE — 93294 REM INTERROG EVL PM/LDLS PM: CPT | Performed by: INTERNAL MEDICINE

## 2024-05-08 PROCEDURE — 93296 REM INTERROG EVL PM/IDS: CPT

## 2024-06-15 LAB
BODY SURFACE AREA: 1.5 M2
BODY SURFACE AREA: 1.5 M2

## 2024-08-07 ENCOUNTER — HOSPITAL ENCOUNTER (OUTPATIENT)
Dept: CARDIOLOGY | Facility: CLINIC | Age: 84
Discharge: HOME | End: 2024-08-07
Payer: MEDICARE

## 2024-08-07 DIAGNOSIS — I49.5 SICK SINUS SYNDROME (MULTI): ICD-10-CM

## 2024-08-07 DIAGNOSIS — Z95.0 PRESENCE OF CARDIAC PACEMAKER: ICD-10-CM

## 2024-08-07 PROCEDURE — 93296 REM INTERROG EVL PM/IDS: CPT

## 2024-08-07 PROCEDURE — 93294 REM INTERROG EVL PM/LDLS PM: CPT | Performed by: INTERNAL MEDICINE

## 2024-11-11 ENCOUNTER — HOSPITAL ENCOUNTER (OUTPATIENT)
Dept: CARDIOLOGY | Facility: CLINIC | Age: 84
Discharge: HOME | End: 2024-11-11
Payer: MEDICARE

## 2024-11-11 DIAGNOSIS — I49.5 SICK SINUS SYNDROME (MULTI): ICD-10-CM

## 2024-11-11 DIAGNOSIS — Z95.0 PACEMAKER: ICD-10-CM

## 2024-11-11 PROCEDURE — 93296 REM INTERROG EVL PM/IDS: CPT

## 2024-11-11 PROCEDURE — 93294 REM INTERROG EVL PM/LDLS PM: CPT | Performed by: INTERNAL MEDICINE

## 2025-03-05 ENCOUNTER — HOSPITAL ENCOUNTER (OUTPATIENT)
Dept: CARDIOLOGY | Facility: CLINIC | Age: 85
Discharge: HOME | End: 2025-03-05
Payer: MEDICARE

## 2025-03-05 DIAGNOSIS — I49.5 SICK SINUS SYNDROME (MULTI): ICD-10-CM

## 2025-03-05 DIAGNOSIS — Z95.0 PACEMAKER: ICD-10-CM

## 2025-03-05 PROCEDURE — 93296 REM INTERROG EVL PM/IDS: CPT

## 2025-04-16 ENCOUNTER — TELEPHONE (OUTPATIENT)
Facility: CLINIC | Age: 85
End: 2025-04-16
Payer: MEDICARE